# Patient Record
Sex: FEMALE | URBAN - METROPOLITAN AREA
[De-identification: names, ages, dates, MRNs, and addresses within clinical notes are randomized per-mention and may not be internally consistent; named-entity substitution may affect disease eponyms.]

---

## 2021-03-10 ENCOUNTER — EMERGENCY (EMERGENCY)
Facility: HOSPITAL | Age: 76
LOS: 1 days | Discharge: HOME | End: 2021-03-10
Attending: EMERGENCY MEDICINE | Admitting: EMERGENCY MEDICINE
Payer: MEDICARE

## 2021-03-10 VITALS
RESPIRATION RATE: 18 BRPM | SYSTOLIC BLOOD PRESSURE: 147 MMHG | OXYGEN SATURATION: 100 % | DIASTOLIC BLOOD PRESSURE: 83 MMHG | TEMPERATURE: 98 F | HEART RATE: 82 BPM

## 2021-03-10 VITALS
WEIGHT: 158.07 LBS | DIASTOLIC BLOOD PRESSURE: 90 MMHG | HEART RATE: 90 BPM | HEIGHT: 69 IN | SYSTOLIC BLOOD PRESSURE: 153 MMHG | OXYGEN SATURATION: 96 % | TEMPERATURE: 98 F | RESPIRATION RATE: 18 BRPM
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
## 2021-03-10 DIAGNOSIS — S01.111A LACERATION WITHOUT FOREIGN BODY OF RIGHT EYELID AND PERIOCULAR AREA, INITIAL ENCOUNTER: ICD-10-CM
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
## 2021-03-10 DIAGNOSIS — S80.212A ABRASION, LEFT KNEE, INITIAL ENCOUNTER: ICD-10-CM
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
## 2021-03-10 DIAGNOSIS — S09.90XA UNSPECIFIED INJURY OF HEAD, INITIAL ENCOUNTER: ICD-10-CM

## 2021-03-10 DIAGNOSIS — M25.511 PAIN IN RIGHT SHOULDER: ICD-10-CM

## 2021-03-10 PROCEDURE — 99284 EMERGENCY DEPT VISIT MOD MDM: CPT | Mod: 25

## 2021-03-10 PROCEDURE — 70450 CT HEAD/BRAIN W/O DYE: CPT

## 2021-03-10 PROCEDURE — 70450 CT HEAD/BRAIN W/O DYE: CPT | Mod: 26,MG

## 2021-03-10 PROCEDURE — 73030 X-RAY EXAM OF SHOULDER: CPT

## 2021-03-10 PROCEDURE — 72125 CT NECK SPINE W/O DYE: CPT | Mod: 26,MG

## 2021-03-10 PROCEDURE — G1004: CPT

## 2021-03-10 PROCEDURE — 72125 CT NECK SPINE W/O DYE: CPT

## 2021-03-10 PROCEDURE — 71250 CT THORAX DX C-: CPT | Mod: 26,MG

## 2021-03-10 PROCEDURE — 73562 X-RAY EXAM OF KNEE 3: CPT | Mod: 26,LT

## 2021-03-10 PROCEDURE — 73030 X-RAY EXAM OF SHOULDER: CPT | Mod: 26,RT

## 2021-03-10 PROCEDURE — 12011 RPR F/E/E/N/L/M 2.5 CM/<: CPT

## 2021-03-10 PROCEDURE — 73562 X-RAY EXAM OF KNEE 3: CPT

## 2021-03-10 PROCEDURE — 71250 CT THORAX DX C-: CPT

## 2021-03-10 RX ORDER — MECLIZINE HCL 12.5 MG
25 TABLET ORAL ONCE
Refills: 0 | Status: COMPLETED | OUTPATIENT
Start: 2021-03-10 | End: 2021-03-10

## 2021-03-10 RX ORDER — ACETAMINOPHEN 500 MG
650 TABLET ORAL ONCE
Refills: 0 | Status: COMPLETED | OUTPATIENT
Start: 2021-03-10 | End: 2021-03-10

## 2021-03-10 RX ADMIN — Medication 650 MILLIGRAM(S): at 14:22

## 2021-03-10 RX ADMIN — Medication 25 MILLIGRAM(S): at 14:22

## 2021-03-10 NOTE — ED PROVIDER NOTE - OBJECTIVE STATEMENT
75F, s/p c-spine surgery for balance recovery (11/2018), gait difficulty, walks with a cane at baseline,  with a pmhx of connective tissue disorder, osteoporosis, anxiety, depression, and HTN presents for an accidental fall. Pt has sustained facial lac above R eyebrow, L knee abrasion, and admits to acute dizziness (fainting sensation), fatigue, right arm pain and right rib pain. Pt was walking on sidewalk when she tripped and fell. A bystander assisted her getting off the floor. Pt reports her most occurrences of falls were x2 weeks ago and x3 weeks ago. Pt denies any hip pain, n, or any other acute medical complaints at this time. Pt does not take any  blood thinners. PCP is Dr. Noguera ([place]) 75F with a pmhx of connective tissue disorder, osteoporosis, anxiety, depression, and HTN , frequent falls, and "some gait issues" presents to ER after an accidental fall on the street. Pt has sustained facial lac above R eyebrow, L knee abrasion, and admits to acute dizziness, fatigue, right arm pain and right rib pain. Pt was walking on sidewalk when she tripped, lost her balance and fell. A bystander assisted her getting off the ground  Pt denies any hip pain, CP, abdominal pain, has no other acute medical complaints at this time. Pt does not take any  blood thinners.

## 2021-03-10 NOTE — ED PROVIDER NOTE - CARE PLAN
Principal Discharge DX:	Fall  Secondary Diagnosis:	Acute head injury without loss of consciousness  Secondary Diagnosis:	Multiple contusions

## 2021-03-10 NOTE — ED PROVIDER NOTE - ENMT, MLM
Airway patent, Nasal mucosa clear. Mouth with normal mucosa. Throat has no vesicles, no oropharyngeal exudates and uvula is midline. Small superficial laceration to right eyebrow, with no facial bone tenderness to palpation, no deformity, no periorbital hematoma. Airway patent, Nasal mucosa clear. Mouth with normal mucosa. Throat has no vesicles, no oropharyngeal exudates and uvula is midline.

## 2021-03-10 NOTE — ED PROVIDER NOTE - PROGRESS NOTE DETAILS
results of imaging discussed with pt. No tenderness to the left ribs, no acute fx suspected on the left. will repair small right eyebrow laceration and d/c home. returned precautions discussed with pt. she agrees with a plan.

## 2021-03-10 NOTE — ED PROVIDER NOTE - PATIENT PORTAL LINK FT
You can access the FollowMyHealth Patient Portal offered by [facility] by registering at the following website: [email or web address]. By joining [facility]’s FollowMyHealth portal, you will also be able to view your health information using other applications (apps) compatible with our system.

## 2021-03-10 NOTE — ED PROVIDER NOTE - CLINICAL SUMMARY MEDICAL DECISION MAKING FREE TEXT BOX
75F presents to the ED status post an accidental fall. Will order CT head, CT chest, XRay left knee, Xray right shoulder and labs. Will administer pain management and reassess. 75F presents to the ED status post an accidental fall. pt ambulatory and well appearing. Reports fall purely , she tripped on the street. Pt has VSS,  no focal neuro deficits, tenderness at the multiple joints and right sided chest wall.  Will order CT head, CT chest, XRay left knee, Xray right shoulder to r/o any fx. anticipate d/c home if no acute findings. 75F presents to the ED status post an accidental fall. pt ambulatory and well appearing. Reports fall purely , she tripped on the street. Pt has VSS,  no focal neuro deficits, has some tenderness with movement  to her left knee, right shoulder joint and right sided chest wall.  laceration repair done wit sutures. imaging revealed no acute fx. incidental findings of left sided rib fx that appears to be old. Pt has no tenderness to her left side of chest wall. ambulate with cane at her baseline, no focal neurodeficits, seeking discharge home.

## 2021-03-10 NOTE — ED ADULT NURSE NOTE - NSIMPLEMENTINTERV_GEN_ALL_ED
Implemented All Fall with Harm Risk Interventions:  [place] to call system. Call bell, personal items and telephone within reach. Instruct patient to call for assistance. Room bathroom lighting operational. Non-slip footwear when patient is off stretcher. Physically safe environment: no spills, clutter or unnecessary equipment. Stretcher in lowest position, wheels locked, appropriate side rails in place. Provide visual cue, wrist band, yellow gown, etc. Monitor gait and stability. Monitor for mental status changes and reorient to person, place, and time. Review medications for side effects contributing to fall risk. Reinforce activity limits and safety measures with patient and family. Provide visual clues: red socks.

## 2021-03-10 NOTE — ED PROVIDER NOTE - MUSCULOSKELETAL, MLM
right shoulder tenderness to palpation with no deformities and full ROM. Right anterior lateral ribs with tenderness to palpation with no flail chest no ecchymosis at the site tenderness. Limited range of motion to c-spine; bilateral muscle tenderness to palpation of neck. Right shoulder tenderness to palpation with no deformities and full ROM. Right anterior lateral ribs with tenderness to palpation with no flail chest, and no ecchymosis at the site tenderness.

## 2021-03-10 NOTE — ED ADULT TRIAGE NOTE - OTHER COMPLAINTS
biba from home c/o right sided body pain, neck pain and dizziness s/p trip and fall this  morning. Pt reports tripping outside on side walk.  + head injury.  no loc, no blood thinners.

## 2021-03-10 NOTE — ED PROVIDER NOTE - NSFOLLOWUPINSTRUCTIONS_ED_ALL_ED_FT
HEAD INJURY - General Information           Head Injury    WHAT YOU NEED TO KNOW:    What do I need to know about a head injury? A head injury can include your scalp, face, skull, or brain and range from mild to severe. Effects can appear immediately after the injury or develop later. The effects may last a short time or be permanent. Healthcare providers may want to check your recovery over time. Treatment may change as you recover or develop new health problems from the head injury.    What are the signs and symptoms of a head injury?   •An open scalp or skin wound, swelling, or bruising      •Mild to moderate headache      •Dizziness or loss of balance      •Nausea or vomiting      •Ringing in the ears or neck pain      •Confusion, especially right after the injury      •Change in mood, such as feeling restless or irritable      •Trouble thinking, remembering, or concentrating      •Drowsiness or decreased amount of energy      •Trouble sleeping      How is a head injury diagnosed?   •Tell your healthcare provider about your injury and symptoms. The provider will do an exam to check your brain function. He or she will check how your pupils react to light. He or she will check your memory, hand grasp, and balance.      •You may need x-rays, a CT scan, or an MRI to check for bleeding or major damage to your skull or brain. You may be given contrast liquid to help the pictures show up better. Tell the healthcare provider if you have ever had an allergic reaction to contrast liquid. Do not enter the MRI room with anything metal. Metal can cause serious injury. Tell the provider if you have any metal in or on your body.      How is a head injury treated? A mild head injury may not need to be treated. You may be given medicine to decrease pain. Other treatments may depend on how severe your head injury is. A concussion, hematoma (collection of blood), or traumatic brain injury may need both immediate and long-term treatment.    How can I manage my symptoms?   •Rest or do quiet activities. Limit your time watching TV, using the computer, or doing tasks that require a lot of thinking. Slowly return to your normal activities as directed. Do not play sports or do activities that may cause you to get hit in the head. Ask your healthcare provider when you can return to sports.      •Apply ice on your head for 15 to 20 minutes every hour or as directed. Use an ice pack, or put crushed ice in a plastic bag. Cover it with a towel before you apply it to your skin. Ice helps prevent tissue damage and decreases swelling and pain.      •Have someone stay with you for 24 hours , or as directed. This person can monitor you for problems and call for help if needed. When you are awake, the person should ask you a few questions every few hours to see if you are thinking clearly. An example is to ask your name or address.      What can I do to prevent another head injury?   •Wear a helmet that fits properly. Do this when you play sports, or ride a bike, scooter, or skateboard. Helmets help decrease your risk for a serious head injury. Talk to your healthcare provider about other ways you can protect yourself if you play sports.      •Wear your seatbelt every time you are in a car. This helps lower your risk for a head injury if you are in a car accident.      Call your local emergency number (911 in the ), or have someone else call if:   •You cannot be woken.      •You have a seizure.      •You stop responding to others or you faint.      •You have blurry or double vision.      •Your speech becomes slurred or confused.      •You have arm or leg weakness, loss of feeling, or new problems with coordination.      •Your pupils are larger than usual, or one pupil is a different size than the other.      •You have blood or clear fluid coming out of your ears or nose.      When should I seek immediate care?   •You have repeated or forceful vomiting.      •You feel confused.      •Your headache gets worse or becomes severe.      •You or someone caring for you notices that you are harder to wake than usual.      When should I call my doctor?   •Your symptoms last longer than 6 weeks after the injury.      •You have questions or concerns about your condition or care.      CARE AGREEMENT:    You have the right to help plan your care. Learn about your health condition and how it may be treated. Discuss treatment options with your healthcare providers to decide what care you want to receive. You always have the right to refuse treatment.                     CONTUSION IN ADULTS - AfterCare(R) Instructions(ER/ED)           Contusion in Adults    WHAT YOU NEED TO KNOW:    A contusion is a bruise that appears on your skin after an injury. A bruise happens when small blood vessels tear but skin does not. Blood leaks into nearby tissue, such as soft tissue or muscle.    DISCHARGE INSTRUCTIONS:    Return to the emergency department if:   •You have new trouble moving the injured area.      •You have tingling or numbness in or near the injured area.      •Your hand or foot below the bruise gets cold or turns pale.       Call your doctor if:   •You find a new lump in the injured area.      •Your symptoms do not improve with treatment after 4 to 5 days.      •You have questions or concerns about your condition or care.      Medicines: You may need any of the following:   •NSAIDs help decrease swelling and pain or fever. This medicine is available with or without a doctor's order. NSAIDs can cause stomach bleeding or kidney problems in certain people. If you take blood thinner medicine, always ask your healthcare provider if NSAIDs are safe for you. Always read the medicine label and follow directions.      •Prescription pain medicine may be given. Ask your healthcare provider how to take this medicine safely. Some prescription pain medicines contain acetaminophen. Do not take other medicines that contain acetaminophen without talking to your healthcare provider. Too much acetaminophen may cause liver damage. Prescription pain medicine may cause constipation. Ask your healthcare provider how to prevent or treat constipation.       •Take your medicine as directed. Contact your healthcare provider if you think your medicine is not helping or if you have side effects. Tell him of her if you are allergic to any medicine. Keep a list of the medicines, vitamins, and herbs you take. Include the amounts, and when and why you take them. Bring the list or the pill bottles to follow-up visits. Carry your medicine list with you in case of an emergency.      Help a contusion heal:   •Rest the injured area or use it less than usual. If you bruised your leg or foot, you may need crutches or a cane to help you walk. This will help you keep weight off your injured body part.       •Apply ice to decrease swelling and pain. Ice may also help prevent tissue damage. Use an ice pack, or put crushed ice in a plastic bag. Cover it with a towel and place it on your bruise for 15 to 20 minutes every hour or as directed.      •Use compression to support the area and decrease swelling. Wrap an elastic bandage around the area over the bruised muscle. Make sure the bandage is not too tight. You should be able to fit 1 finger between the bandage and your skin.      •Elevate (raise) your injured body part above the level of your heart to help decrease pain and swelling. Use pillows, blankets, or rolled towels to elevate the area as often as you can.      •Do not drink alcohol as directed. Alcohol may slow healing.      •Do not stretch injured muscles right after your injury. Ask your healthcare provider when and how you may safely stretch after your injury. Gentle stretches can help increase your flexibility.      •Do not massage the area or put heating pads on the bruise right after your injury. Heat and massage may slow healing. Your healthcare provider may tell you to apply heat after several days. At that time, heat will start to help the injury heal.      Prevent another contusion:   •Stretch and warm up before you play sports or exercise.      •Wear protective gear when you play sports. Examples are shin guards and padding.       •If you begin a new physical activity, start slowly to give your body a chance to adjust.      Follow up with your doctor as directed: Write down your questions so you remember to ask them during your visits.

## 2021-03-10 NOTE — ED PROVIDER NOTE - ATTENDING CONTRIBUTION TO CARE
baseline unsteady gait. Was feeling like usual self today. Was walking and tripped. Landed on L knee, R chest/shoulder/head. C/o pain to those areas. Also c/o vague dizziness since the fall which has been slowly improving.   Denies LOC, vision changes, eye pain, focal weakness/numbness, neck/back pain, SOB, other CP, other HA, abd pain, NVD, black/bloody stool, urinary complaints, URI symptoms. Denies recent illnesses or medication changes. Not on AC.  In ED: CTH/c-spine w/ no acute pathology. CT chest w/ ?subacute L fx. Clinically, pt has no pain/ttp to L chest. XR w/ no acute pathology.  Pt feeling much better. abd remains soft NTND. Remains very well appearing. Ambulating like at baseline.  R forehead w/ small skin avulsion that would likely not benefit from closure.   Clinically no indication for further emergent ED workup or hospitalization at this time. Comfortable for dc, outpt f/u.   ddx: mechanical fall. mild head trauma, superficial avulsion, MSK pain. baseline unsteady gait. Was feeling like usual self today. Was walking and tripped. Landed on L knee, R chest/shoulder/head. C/o pain to those areas. Also c/o vague dizziness since the fall which has been slowly improving.   Denies LOC, vision changes, eye pain, focal weakness/numbness, neck/back pain, SOB, other CP, other HA, abd pain, NVD, black/bloody stool, urinary complaints, URI symptoms. Denies recent illnesses or medication changes. Not on AC.  In ED: CTH/c-spine w/ no acute pathology. CT chest w/ ?subacute L fx. Clinically, pt has no pain/ttp to L chest. XR w/ no acute pathology.  Pt feeling much better. abd remains soft NTND. Remains very well appearing. Ambulating like at baseline.  R forehead w/ small skin avulsion, sutured by PA.   Clinically no indication for further emergent ED workup or hospitalization at this time. Comfortable for dc, outpt f/u.   ddx: mechanical fall. mild head trauma, superficial avulsion, MSK pain.

## 2021-03-10 NOTE — ED ADULT NURSE NOTE - OBJECTIVE STATEMENT
Pt presents to ED c/o fall on side walk today. +right sided body pain, +dizziness, denies LOC, hitting head, use of blood thinners. Hx of frequent falls. Pt A&Ox4,  speaking in clear/full sentences, no acute distress, vital signs stable.

## 2021-03-10 NOTE — ED PROVIDER NOTE - SKIN, MLM
Skin normal color for race, warm, dry and intact. No evidence of rash. Small superficial lac to right eyebrow; left knee abrasion.

## 2021-03-11 DIAGNOSIS — Z98.890 OTHER SPECIFIED POSTPROCEDURAL STATES: Chronic | ICD-10-CM

## 2022-02-18 NOTE — ED PROVIDER NOTE - CCCP TRG CHIEF CMPLNT
Spoke w/ pt about recent loss of  and ongoing grief process. Had prayer. Pt received AD paperwork. Is being discharged, but may fill out AD and seek notary when she returns for follow-up visit.  
fall
